# Patient Record
Sex: FEMALE | Race: WHITE
[De-identification: names, ages, dates, MRNs, and addresses within clinical notes are randomized per-mention and may not be internally consistent; named-entity substitution may affect disease eponyms.]

---

## 2024-09-03 ENCOUNTER — APPOINTMENT (OUTPATIENT)
Dept: ORTHOPEDIC SURGERY | Facility: CLINIC | Age: 59
End: 2024-09-03
Payer: OTHER MISCELLANEOUS

## 2024-09-03 DIAGNOSIS — I10 ESSENTIAL (PRIMARY) HYPERTENSION: ICD-10-CM

## 2024-09-03 DIAGNOSIS — S69.90XA UNSPECIFIED INJURY OF UNSPECIFIED WRIST, HAND AND FINGER(S), INITIAL ENCOUNTER: ICD-10-CM

## 2024-09-03 PROBLEM — Z00.00 ENCOUNTER FOR PREVENTIVE HEALTH EXAMINATION: Status: ACTIVE | Noted: 2024-09-03

## 2024-09-03 PROCEDURE — 99204 OFFICE O/P NEW MOD 45 MIN: CPT

## 2024-09-03 PROCEDURE — 73140 X-RAY EXAM OF FINGER(S): CPT | Mod: RT

## 2024-09-04 PROBLEM — S69.90XA FINGER INJURY: Status: ACTIVE | Noted: 2024-09-03

## 2024-09-04 NOTE — HISTORY OF PRESENT ILLNESS
[de-identified] : 59F, RHD presents with right ring finger fx due to a worker's comp injury. Patient reports she was cleaning a toilet and finger bent 8/13/24. Went to a [facility] and had XR imaging done that confirmed a fx. Currently in a splint.  Occupation:

## 2024-09-04 NOTE — HISTORY OF PRESENT ILLNESS
[de-identified] : 59F, RHD presents with right ring finger fx due to a worker's comp injury. Patient reports she was cleaning a toilet and finger bent 8/13/24. Went to a [facility] ER and had XR imaging done that confirmed a fx. Currently in a splint.  Occupation:

## 2024-09-04 NOTE — ASSESSMENT
[FreeTextEntry1] : Right ring finger avulsion fracture - reviewed radiographs and pathoanatomy with patient. Discussed the current alignment both radiographically and clinically are within acceptable parameters to manage nonoperatively. Will manage in coban steve tape, ROM permitted, NWB. Elevate. Discussed risks of pain, stiffness, and displacement requiring intervention. Cease splinting as it is leading to stiffness and begin ROM.  F/u 3 week; reassess

## 2024-09-04 NOTE — IMAGING
[de-identified] : Right ring finger with mild swelling, ski intact. +ttp at PIPj. Able to gently flex and extend at MCP, PIP and DIP with no rotational deformity. Sensation intact at radial and ulnar pulp. <2sec cap refill.   Right ring finger radiographs with nondisplaced avulsion fracture. (3-view)

## 2024-09-04 NOTE — IMAGING
[de-identified] : Right ring finger with mild swelling, ski intact. +ttp at PIPj. Able to gently flex and extend at MCP, PIP and DIP with no rotational deformity. Sensation intact at radial and ulnar pulp. <2sec cap refill.   Right ring finger radiographs with nondisplaced avulsion fracture. (3-view)

## 2024-09-23 ENCOUNTER — APPOINTMENT (OUTPATIENT)
Dept: ORTHOPEDIC SURGERY | Facility: CLINIC | Age: 59
End: 2024-09-23
Payer: OTHER MISCELLANEOUS

## 2024-09-23 DIAGNOSIS — S69.90XA UNSPECIFIED INJURY OF UNSPECIFIED WRIST, HAND AND FINGER(S), INITIAL ENCOUNTER: ICD-10-CM

## 2024-09-23 PROCEDURE — 99214 OFFICE O/P EST MOD 30 MIN: CPT

## 2024-09-23 RX ORDER — ENALAPRIL MALEATE 5 MG/1
TABLET ORAL
Refills: 0 | Status: ACTIVE | COMMUNITY

## 2024-09-23 NOTE — IMAGING
[de-identified] : Right ring finger with resolved swelling, skin intact. +ttp at PIPj. Able to flex and extend at MCP, PIP and DIP with no rotational deformity. Sensation intact at radial and ulnar pulp. <2sec cap refill.   Right ring finger radiographs with nondisplaced avulsion fracture. (3-view)

## 2024-09-23 NOTE — ASSESSMENT
[FreeTextEntry1] : Right ring finger avulsion fracture - reviewed radiographs and pathoanatomy with patient. Discussed the current alignment both radiographically and clinically are within acceptable parameters to manage nonoperatively. Will manage in coban steve tape, ROM permitted, WBAT. Elevate. Discussed risks of pain, stiffness, and displacement requiring intervention. OT for ROM.  F/u 6 week; reassess

## 2024-09-23 NOTE — HISTORY OF PRESENT ILLNESS
[de-identified] : 59F, RHD presents with right ring finger fx due to a worker's comp injury. Patient reports she was cleaning a toilet and finger bent 8/13/24. Went to a [place] [facility] ER and had XR imaging done that confirmed a fx. Currently in a splint.  Occupation:    09/23/24: Patient f/u right ring finger fracture. Patient reports the pain in his right ring finger has improved since her last visit. Patient reports taking ibuprofen PRN with relief. Patient reports she D/C wearing a finger splint as her pain has improved.   number: [id number].

## 2025-07-30 ENCOUNTER — APPOINTMENT (OUTPATIENT)
Dept: ORTHOPEDIC SURGERY | Facility: CLINIC | Age: 60
End: 2025-07-30